# Patient Record
Sex: MALE | Race: WHITE | NOT HISPANIC OR LATINO | URBAN - METROPOLITAN AREA
[De-identification: names, ages, dates, MRNs, and addresses within clinical notes are randomized per-mention and may not be internally consistent; named-entity substitution may affect disease eponyms.]

---

## 2022-08-11 ENCOUNTER — LAB OUTSIDE AN ENCOUNTER (OUTPATIENT)
Dept: URBAN - METROPOLITAN AREA CLINIC 98 | Facility: CLINIC | Age: 72
End: 2022-08-11

## 2022-08-11 ENCOUNTER — DASHBOARD ENCOUNTERS (OUTPATIENT)
Age: 72
End: 2022-08-11

## 2022-08-11 ENCOUNTER — TELEPHONE ENCOUNTER (OUTPATIENT)
Dept: URBAN - METROPOLITAN AREA CLINIC 6 | Facility: CLINIC | Age: 72
End: 2022-08-11

## 2022-08-11 ENCOUNTER — TELEPHONE ENCOUNTER (OUTPATIENT)
Dept: URBAN - METROPOLITAN AREA CLINIC 96 | Facility: CLINIC | Age: 72
End: 2022-08-11

## 2022-08-11 ENCOUNTER — OFFICE VISIT (OUTPATIENT)
Dept: URBAN - METROPOLITAN AREA CLINIC 98 | Facility: CLINIC | Age: 72
End: 2022-08-11
Payer: COMMERCIAL

## 2022-08-11 VITALS
TEMPERATURE: 97 F | BODY MASS INDEX: 34.56 KG/M2 | HEART RATE: 85 BPM | SYSTOLIC BLOOD PRESSURE: 158 MMHG | DIASTOLIC BLOOD PRESSURE: 94 MMHG | HEIGHT: 67 IN | WEIGHT: 220.2 LBS

## 2022-08-11 DIAGNOSIS — Z86.010 PERSONAL HISTORY OF COLONIC POLYPS: ICD-10-CM

## 2022-08-11 DIAGNOSIS — Z12.11 COLON CANCER SCREENING: ICD-10-CM

## 2022-08-11 PROCEDURE — 99203 OFFICE O/P NEW LOW 30 MIN: CPT

## 2022-08-11 RX ORDER — ALLOPURINOL 100 MG/1
TAKE 1 TABLET (100 MG) BY ORAL ROUTE ONCE DAILY TABLET ORAL 1
Qty: 0 | Refills: 0 | Status: ACTIVE | COMMUNITY
Start: 1900-01-01

## 2022-08-11 RX ORDER — RIVAROXABAN 20 MG/1
TAKE 1 TABLET (20 MG) BY ORAL ROUTE ONCE DAILY WITH THE EVENING MEAL TABLET, FILM COATED ORAL 1
Qty: 0 | Refills: 0 | Status: ACTIVE | COMMUNITY
Start: 1900-01-01

## 2022-08-11 RX ORDER — ATORVASTATIN CALCIUM 10 MG/1
TAKE 1 TABLET (10 MG) BY ORAL ROUTE ONCE DAILY AT BEDTIME TABLET, FILM COATED ORAL 1
Qty: 0 | Refills: 0 | Status: ACTIVE | COMMUNITY
Start: 1900-01-01

## 2022-08-11 RX ORDER — LEVOTHYROXINE SODIUM 0.09 MG/1
TAKE 1 TABLET (88 MCG) BY ORAL ROUTE ONCE DAILY TABLET ORAL 1
Qty: 0 | Refills: 0 | Status: ACTIVE | COMMUNITY
Start: 1900-01-01

## 2022-08-11 RX ORDER — FENOFIBRIC ACID 135 MG/1
TAKE 1 CAPSULE (135 MG) BY ORAL ROUTE ONCE DAILY CAPSULE, DELAYED RELEASE ORAL 1
Qty: 0 | Refills: 0 | Status: ACTIVE | COMMUNITY
Start: 1900-01-01

## 2022-08-11 RX ORDER — GLUCOSAMINE HCL/CHONDROITIN SU 500-400 MG
CAPSULE ORAL
Qty: 0 | Refills: 0 | Status: ACTIVE | COMMUNITY
Start: 1900-01-01

## 2022-08-11 RX ORDER — COLCHICINE 0.6 MG/1
TABLET, FILM COATED ORAL
Qty: 0 | Refills: 0 | Status: ACTIVE | COMMUNITY
Start: 1900-01-01

## 2022-08-11 RX ORDER — SODIUM, POTASSIUM,MAG SULFATES 17.5-3.13G
177 ML SOLUTION, RECONSTITUTED, ORAL ORAL AS DIRECTED
Qty: 1 BOX | Refills: 0 | OUTPATIENT
Start: 2022-08-11 | End: 2022-08-13

## 2022-08-11 NOTE — HPI-TODAY'S VISIT:
Patient is a 72-year-old male who presents for consultation for colonoscopy.   He has a history of large polyps including an 18mm polyp that was resected and retrieved in 2016 with Dr. Sánchez. Found to be tubular adenoma.  Last colonoscopy completed in 4/2017 by Dr. Sánchez.  Diverticulosis noted throughout the colon was small and large in the ascending colon.  There was a tattoo which was noted to adjacent to a scar, medium size which appeared normal.  Flat polyp 6 mm was removed. Small 3 mm polyp in the sigmoid colon which was removed.   Pathology results revealed hyperplastic polyp in the sigmoid colon and tubular adenoma in the ascending colon.  Recommended repeat colonoscopy in 3 years BMs are daily- can flutuate between constipation and loose stools He will take Miralax as needed for constipation He believes loose stools are usually diet induced  Denies straining or pain with defecation Denies BRBPR or melena Last labs done with rheumatologist- 7/2022. No anemia, appropriate kidney function, normal liver enzymes Dr. Garza is cardiologist- pt takes xarelto for a.fib Denies unintentional weight loss Denies fever or chills

## 2022-09-09 PROBLEM — 428283002: Status: ACTIVE | Noted: 2022-08-11

## 2022-11-01 ENCOUNTER — CLAIMS CREATED FROM THE CLAIM WINDOW (OUTPATIENT)
Dept: URBAN - METROPOLITAN AREA CLINIC 4 | Facility: CLINIC | Age: 72
End: 2022-11-01
Payer: COMMERCIAL

## 2022-11-01 ENCOUNTER — OFFICE VISIT (OUTPATIENT)
Dept: URBAN - METROPOLITAN AREA SURGERY CENTER 18 | Facility: SURGERY CENTER | Age: 72
End: 2022-11-01
Payer: COMMERCIAL

## 2022-11-01 DIAGNOSIS — Z86.010 ADENOMAS PERSONAL HISTORY OF COLONIC POLYPS: ICD-10-CM

## 2022-11-01 DIAGNOSIS — D12.3 BENIGN NEOPLASM OF TRANSVERSE COLON: ICD-10-CM

## 2022-11-01 DIAGNOSIS — D12.3 ADENOMA OF TRANSVERSE COLON: ICD-10-CM

## 2022-11-01 DIAGNOSIS — K63.89 BACTERIAL OVERGROWTH SYNDROME: ICD-10-CM

## 2022-11-01 PROCEDURE — 45380 COLONOSCOPY AND BIOPSY: CPT | Performed by: INTERNAL MEDICINE

## 2022-11-01 PROCEDURE — G8907 PT DOC NO EVENTS ON DISCHARG: HCPCS | Performed by: INTERNAL MEDICINE

## 2022-11-01 PROCEDURE — 45385 COLONOSCOPY W/LESION REMOVAL: CPT | Performed by: INTERNAL MEDICINE

## 2022-11-01 PROCEDURE — 88305 TISSUE EXAM BY PATHOLOGIST: CPT | Performed by: PATHOLOGY

## 2022-11-01 RX ORDER — ATORVASTATIN CALCIUM 10 MG/1
TAKE 1 TABLET (10 MG) BY ORAL ROUTE ONCE DAILY AT BEDTIME TABLET, FILM COATED ORAL 1
Qty: 0 | Refills: 0 | Status: ACTIVE | COMMUNITY
Start: 1900-01-01

## 2022-11-01 RX ORDER — GLUCOSAMINE HCL/CHONDROITIN SU 500-400 MG
CAPSULE ORAL
Qty: 0 | Refills: 0 | Status: ACTIVE | COMMUNITY
Start: 1900-01-01

## 2022-11-01 RX ORDER — COLCHICINE 0.6 MG/1
TABLET, FILM COATED ORAL
Qty: 0 | Refills: 0 | Status: ACTIVE | COMMUNITY
Start: 1900-01-01

## 2022-11-01 RX ORDER — FENOFIBRIC ACID 135 MG/1
TAKE 1 CAPSULE (135 MG) BY ORAL ROUTE ONCE DAILY CAPSULE, DELAYED RELEASE ORAL 1
Qty: 0 | Refills: 0 | Status: ACTIVE | COMMUNITY
Start: 1900-01-01

## 2022-11-01 RX ORDER — ALLOPURINOL 100 MG/1
TAKE 1 TABLET (100 MG) BY ORAL ROUTE ONCE DAILY TABLET ORAL 1
Qty: 0 | Refills: 0 | Status: ACTIVE | COMMUNITY
Start: 1900-01-01

## 2022-11-01 RX ORDER — RIVAROXABAN 20 MG/1
TAKE 1 TABLET (20 MG) BY ORAL ROUTE ONCE DAILY WITH THE EVENING MEAL TABLET, FILM COATED ORAL 1
Qty: 0 | Refills: 0 | Status: ACTIVE | COMMUNITY
Start: 1900-01-01

## 2022-11-01 RX ORDER — LEVOTHYROXINE SODIUM 0.09 MG/1
TAKE 1 TABLET (88 MCG) BY ORAL ROUTE ONCE DAILY TABLET ORAL 1
Qty: 0 | Refills: 0 | Status: ACTIVE | COMMUNITY
Start: 1900-01-01